# Patient Record
Sex: FEMALE | Race: WHITE | Employment: FULL TIME | URBAN - METROPOLITAN AREA
[De-identification: names, ages, dates, MRNs, and addresses within clinical notes are randomized per-mention and may not be internally consistent; named-entity substitution may affect disease eponyms.]

---

## 2018-06-16 PROCEDURE — 88175 CYTOPATH C/V AUTO FLUID REDO: CPT | Performed by: FAMILY MEDICINE

## 2019-07-09 PROCEDURE — 88175 CYTOPATH C/V AUTO FLUID REDO: CPT | Performed by: OBSTETRICS & GYNECOLOGY

## 2020-08-25 PROBLEM — O46.8X2 SUBCHORIONIC HEMORRHAGE OF PLACENTA IN SECOND TRIMESTER: Status: ACTIVE | Noted: 2020-08-25

## 2020-08-25 PROBLEM — O41.8X20 SUBCHORIONIC HEMORRHAGE OF PLACENTA IN SECOND TRIMESTER: Status: ACTIVE | Noted: 2020-08-25

## 2020-09-28 PROBLEM — O46.8X2 SUBCHORIONIC HEMORRHAGE OF PLACENTA IN SECOND TRIMESTER: Status: RESOLVED | Noted: 2020-08-25 | Resolved: 2020-09-28

## 2020-09-28 PROBLEM — O41.8X20 SUBCHORIONIC HEMORRHAGE OF PLACENTA IN SECOND TRIMESTER: Status: RESOLVED | Noted: 2020-08-25 | Resolved: 2020-09-28

## 2020-12-21 PROBLEM — O24.419 GESTATIONAL DIABETES MELLITUS (GDM) IN THIRD TRIMESTER: Status: ACTIVE | Noted: 2020-12-21

## 2021-01-21 NOTE — PROGRESS NOTES
Outpatient Maternal-Fetal Medicine Consultation    Dear Dr. Arther Lombard    Thank you for requesting ultrasound evaluation and maternal fetal medicine consultation on your patient Mabel Rosario.   As you are aware she is a 31year old female  with Diabetes Father    • Lipids Father         elevated chol   • Thyroid disease Mother         nodule   • No Known Problems Brother    • No Known Problems Son       Social History    Tobacco Use      Smoking status: Never Smoker      Smokeless tobacco: Never (<180) -- 224                       2 hour (<155) --232                       3 hour (<140) --194       She was informed of the potential implications and risks associated with gestational diabetes (GDM) to her and her unborn child, especially when poorly · Obesity  · A2DM      RECOMMENDATIONS:  · Continue care with Dr. Portillo Given  11-20 lb weight gain for pregnancy  Increase NPH to 20 units at breakfast and 14 units at dinner. Start novolog 14 units at dinner.   Continue four times daily capillary blood glucose

## 2021-01-27 ENCOUNTER — OFFICE VISIT (OUTPATIENT)
Dept: PERINATAL CARE | Facility: HOSPITAL | Age: 31
End: 2021-01-27
Attending: OBSTETRICS & GYNECOLOGY
Payer: COMMERCIAL

## 2021-01-27 VITALS
HEIGHT: 66 IN | HEART RATE: 100 BPM | BODY MASS INDEX: 37.77 KG/M2 | SYSTOLIC BLOOD PRESSURE: 121 MMHG | DIASTOLIC BLOOD PRESSURE: 69 MMHG | WEIGHT: 235 LBS

## 2021-01-27 DIAGNOSIS — O24.414 INSULIN CONTROLLED GESTATIONAL DIABETES MELLITUS (GDM) IN THIRD TRIMESTER: Primary | ICD-10-CM

## 2021-01-27 DIAGNOSIS — O99.213 OBESITY AFFECTING PREGNANCY IN THIRD TRIMESTER: ICD-10-CM

## 2021-01-27 DIAGNOSIS — O24.414 INSULIN CONTROLLED GESTATIONAL DIABETES MELLITUS (GDM) IN THIRD TRIMESTER: ICD-10-CM

## 2021-01-27 PROCEDURE — 76811 OB US DETAILED SNGL FETUS: CPT | Performed by: OBSTETRICS & GYNECOLOGY

## 2021-01-27 PROCEDURE — 99244 OFF/OP CNSLTJ NEW/EST MOD 40: CPT | Performed by: OBSTETRICS & GYNECOLOGY

## 2021-01-27 RX ORDER — INSULIN ASPART 100 [IU]/ML
14 INJECTION, SOLUTION INTRAVENOUS; SUBCUTANEOUS
Qty: 1 VIAL | Refills: 1 | Status: SHIPPED | OUTPATIENT
Start: 2021-01-27 | End: 2021-02-04

## 2021-02-04 ENCOUNTER — TELEPHONE (OUTPATIENT)
Dept: PERINATAL CARE | Facility: HOSPITAL | Age: 31
End: 2021-02-04

## 2021-02-04 DIAGNOSIS — O24.414 INSULIN CONTROLLED GESTATIONAL DIABETES MELLITUS (GDM) IN THIRD TRIMESTER: ICD-10-CM

## 2021-02-04 RX ORDER — INSULIN ASPART 100 [IU]/ML
16 INJECTION, SOLUTION INTRAVENOUS; SUBCUTANEOUS
Qty: 1 VIAL | Refills: 1 | Status: SHIPPED | OUTPATIENT
Start: 2021-02-04 | End: 2021-03-12

## 2021-02-04 NOTE — TELEPHONE ENCOUNTER
New regimen:    NPH AM 20 units  HS 16 units  Novalog PM 16 units    Jarett Lombardi. Elisabeth Mondragon M.D.   Maternal-Fetal Medicine

## 2021-02-04 NOTE — TELEPHONE ENCOUNTER
GA 35    NPH AM 20 units  HS 14 units  Novalog PM 14 units      Fasting 1 out of 7        Range 76 to 101  Breakfast 1 out of 7     Range 78 to 132  Lunch 2 out of 7          Range 82 to 153  Dinner 2 out of 7          Range 95 to 141

## 2021-02-11 ENCOUNTER — TELEPHONE (OUTPATIENT)
Dept: PERINATAL CARE | Facility: HOSPITAL | Age: 31
End: 2021-02-11

## 2021-02-11 NOTE — TELEPHONE ENCOUNTER
GA 36     NPH AM 20 units HS 16 units  Novolog PM 16 units    Fasting 0 out of 4       Range 71 to 80  Breakfast 0 out of 4    Range 58 to 111  Lunch 0 out of 4         Range 71 to 98  Dinner 0 out of 4         Range 84 to 109

## 2021-02-11 NOTE — TELEPHONE ENCOUNTER
No change - continue current regimen. Continue Insulin  NPH AM 20 units HS 16 units  Novolog PM 16 units    Stephie Lopez. Sergio Clark M.D.   Maternal-Fetal Medicine

## 2021-02-18 ENCOUNTER — TELEPHONE (OUTPATIENT)
Dept: PERINATAL CARE | Facility: HOSPITAL | Age: 31
End: 2021-02-18

## 2021-02-18 NOTE — TELEPHONE ENCOUNTER
38w0d  Received BS log for date range 2/11-2/16     Low  High Out of Range   Fasting Blood Sugar 75 84 0 out of 5   Post Breakfast 74 102 0 out of 5   Post Lunch 84 113 0 out of 5    Post Dinner 74 104 0 out of 5     Patient is currently taking   NPH  AM 2

## 2021-02-18 NOTE — TELEPHONE ENCOUNTER
No change - continue current regimen. Continue Insulin  NPH  AM 20 units   HS 16 units  Novolog 16 units at dinner    Felicia Jung. Gurmeet eTmple M.D.   Maternal-Fetal Medicine